# Patient Record
(demographics unavailable — no encounter records)

---

## 2025-07-16 NOTE — PHYSICAL EXAM
[No Rash or Lesion] : No rash or lesion [Alert] : alert [Oriented to Person] : oriented to person [Oriented to Place] : oriented to place [Oriented to Time] : oriented to time [de-identified] : Pleasant female, NAD [de-identified] :  Understood consultation